# Patient Record
Sex: FEMALE | Race: BLACK OR AFRICAN AMERICAN | NOT HISPANIC OR LATINO | Employment: OTHER | URBAN - METROPOLITAN AREA
[De-identification: names, ages, dates, MRNs, and addresses within clinical notes are randomized per-mention and may not be internally consistent; named-entity substitution may affect disease eponyms.]

---

## 2017-04-06 ENCOUNTER — HOSPITAL ENCOUNTER (EMERGENCY)
Facility: OTHER | Age: 47
Discharge: HOME | End: 2017-04-06
Attending: INTERNAL MEDICINE
Payer: MEDICARE

## 2017-04-06 VITALS
HEIGHT: 62 IN | RESPIRATION RATE: 20 BRPM | DIASTOLIC BLOOD PRESSURE: 72 MMHG | SYSTOLIC BLOOD PRESSURE: 127 MMHG | TEMPERATURE: 98 F | WEIGHT: 120 LBS | HEART RATE: 57 BPM | OXYGEN SATURATION: 100 % | BODY MASS INDEX: 22.08 KG/M2

## 2017-04-06 DIAGNOSIS — K08.89 TOOTHACHE: Primary | ICD-10-CM

## 2017-04-06 PROCEDURE — 99283 EMERGENCY DEPT VISIT LOW MDM: CPT

## 2017-04-06 PROCEDURE — 25000003 PHARM REV CODE 250: Performed by: INTERNAL MEDICINE

## 2017-04-06 RX ORDER — IBUPROFEN 400 MG/1
800 TABLET ORAL
Status: COMPLETED | OUTPATIENT
Start: 2017-04-06 | End: 2017-04-06

## 2017-04-06 RX ORDER — CLINDAMYCIN HYDROCHLORIDE 150 MG/1
300 CAPSULE ORAL
Status: COMPLETED | OUTPATIENT
Start: 2017-04-06 | End: 2017-04-06

## 2017-04-06 RX ORDER — IBUPROFEN 800 MG/1
800 TABLET ORAL EVERY 8 HOURS PRN
Qty: 20 TABLET | Refills: 0 | Status: SHIPPED | OUTPATIENT
Start: 2017-04-06

## 2017-04-06 RX ORDER — CLINDAMYCIN HYDROCHLORIDE 150 MG/1
300 CAPSULE ORAL EVERY 8 HOURS
Qty: 30 CAPSULE | Refills: 0 | Status: SHIPPED | OUTPATIENT
Start: 2017-04-06 | End: 2017-04-16

## 2017-04-06 RX ADMIN — IBUPROFEN 800 MG: 400 TABLET ORAL at 10:04

## 2017-04-06 RX ADMIN — CLINDAMYCIN HYDROCHLORIDE 300 MG: 150 CAPSULE ORAL at 10:04

## 2017-04-06 NOTE — ED AVS SNAPSHOT
[facility] EMERGENCY DEPARTMENT  [address] [facility] [address]               Hansa Castro   2017  9:51 PM   ED    Description:  Female : 1970   Department:  [facility] Emergency Department           Your Care was Coordinated By:     Provider Role From To    Spenser Bragg MD Attending Provider [id number] --      Reason for Visit     Dental Pain           Diagnoses this Visit        Comments    Toothache    -  Primary       ED Disposition     ED Disposition Condition Comment    Discharge             To Do List           Follow-up Information     Follow up with Sabrina Schultz MD In 1 week(s).    Specialty:  Family Medicine    Contact information:    [address] [facility] [address]  [phone]         These Medications        Disp Refills Start End    ibuprofen (ADVIL,MOTRIN) 800 MG tablet 20 tablet 0 2017     Take 1 tablet (800 mg total) by mouth every 8 (eight) hours as needed for Pain. - Oral    Pharmacy: [facility] [address] [place] [address] Ph #: [phone]       clindamycin (CLEOCIN) 150 MG capsule 30 capsule 0 2017    Take 2 capsules (300 mg total) by mouth every 8 (eight) hours. - Oral    Pharmacy: [facility] [address] [place] [address] [address] [facility] Ph #: [phone]         [facility]     [facility] Nurse Care Line - 24/7 Assistance  Unless otherwise directed by your provider, please contact [facility], our nurse care line that is available for 24/7 assistance.     Registered nurses in the [facility] provide: appointment scheduling, clinical advisement, health education, and other advisory services.  Call: [phone] (toll free)               Medications           Message regarding Medications     Verify the changes and/or additions to your medication regime listed below are the same as discussed with your clinician today.  If any of these changes or  "additions are incorrect, please notify your healthcare provider.        START taking these NEW medications        Refills    ibuprofen (ADVIL,MOTRIN) 800 MG tablet 0    Sig: Take 1 tablet (800 mg total) by mouth every 8 (eight) hours as needed for Pain.    Class: Normal    Route: Oral    clindamycin (CLEOCIN) 150 MG capsule 0    Sig: Take 2 capsules (300 mg total) by mouth every 8 (eight) hours.    Class: Normal    Route: Oral      These medications were administered today        Dose Freq    clindamycin capsule 300 mg 300 mg ED 1 Time    Sig: Take 2 capsules (300 mg total) by mouth ED 1 Time.    Class: Normal    Route: Oral    ibuprofen tablet 800 mg 800 mg ED 1 Time    Sig: Take 2 tablets (800 mg total) by mouth ED 1 Time.    Class: Normal    Route: Oral           Verify that the below list of medications is an accurate representation of the medications you are currently taking.  If none reported, the list may be blank. If incorrect, please contact your healthcare provider. Carry this list with you in case of emergency.           Current Medications     clindamycin (CLEOCIN) 150 MG capsule Take 2 capsules (300 mg total) by mouth every 8 (eight) hours.    cyclobenzaprine (FLEXERIL) 5 MG tablet Take 1 tablet (5 mg total) by mouth 3 (three) times daily as needed.    ibuprofen (ADVIL,MOTRIN) 800 MG tablet Take 1 tablet (800 mg total) by mouth every 8 (eight) hours as needed for Pain.    omeprazole (PRILOSEC) 40 MG capsule Take 1 capsule (40 mg total) by mouth once daily.           Clinical Reference Information           Your Vitals Were     BP Pulse Temp Resp Height Weight    127/72 (BP Location: Left arm, Patient Position: Sitting) 57 97.7 °F (36.5 °C) (Oral) 20 5' 2" (1.575 m) 54.4 kg (120 lb)    SpO2 BMI             100% 21.95 kg/m2         Allergies as of 4/6/2017        Reactions    Pcn [Penicillins] Hives, Itching      Immunizations Administered on Date of Encounter - 4/6/2017     None      ED Micro, Lab, POCT  " "   None      ED Imaging Orders     None        Discharge Instructions           Dental Pain    A crack or cavity in a tooth can cause tooth pain. This is because the crack or cavity exposes the sensitive inner area of the tooth. An infection in the gum or the root of the tooth can cause pain and swelling. The pain is often made worse when you drink hot or cold beverages. It can also be worse when you bite on hard foods. Pain may spread from the tooth to your ear or the area of the jaw on the same side.  Home care  Follow these tips when caring for yourself at home:  · Avoid hot and cold foods and drinks. Your tooth may be sensitive to changes in temperature.  · Use toothpaste made for sensitive teeth. Brush gently up and down instead of sideways. Brushing sideways can wear away root surfaces if they are exposed.  · If your tooth is chipped or cracked, or if there is a large open cavity, put oil of cloves directly on the tooth to relieve pain. You can buy oil of cloves at drugstores. Some pharmacies carry an over-the-counter "toothache kit." This contains a paste that you can put on the exposed tooth to make it less sensitive.  · Put a cold pack on your jaw over the sore area to help reduce pain.  · You may use over-the-counter medicine to ease pain, unless your doctor prescribed another medicine. If you have chronic liver or kidney disease, talk with your healthcare provider before using acetaminophen or ibuprofen. Also talk with your provider if youve had a stomach ulcer or GI bleeding.  · If you have signs of an infection, you will be given an antibiotic. Take it as directed.  Follow-up care  Follow up with your dentist, or as advised. Your pain may go away with the treatment given today. But only a dentist can fully look at and treat the cause of your pain. This will keep the pain from coming back.  Call 911  Call 911 if any of these occur:  · Unusual drowsiness  · Headache or stiff neck  · Weakness or " fainting  · Difficulty swallowing or breathing  When to seek medical advice  Call your health care provider right away if any of these occur:  · Your face becomes swollen or red  · Pain gets worse or spreads to your neck  · Fever of 100.4º F (38.0º C) or higher, or as directed by your healthcare provider  · Pus drains from the tooth  Date Last Reviewed: 10/1/2016  © 6762-9302 [employer]. [address] [place] [address], [place] [address]. All rights reserved. This information is not intended as a substitute for professional medical care. Always follow your healthcare professional's instructions.          [facility] Sign-Up     Activating your [facility] account is as easy as 1-2-3!     1) Visit [email or web address], select Sign Up Now, enter this activation code and your date of birth, then select Next.  [email or web address]  Expires: 5/21/2017 10:18 PM      2) Create a username and password to use when you visit [facility] in the future and select a security question in case you lose your password and select Next.    3) Enter your e-mail address and click Sign Up!    Additional Information  If you have questions, please e-mail [email or web address] or call [phone] to talk to our [facility] staff. Remember, [facility] is NOT to be used for urgent needs. For medical emergencies, dial 911.          [facility] Emergency Department complies with applicable Federal civil rights laws and does not discriminate on the basis of race, color, national origin, age, disability, or sex.        Language Assistance Services     ATTENTION: Language assistance services are available, free of charge. Please call [phone].      ATENCIÓN: Si habla español, tiene a asher disposición servicios gratuitos de asistencia lingüística. Llame al [phone].     CHÚ Ý: N?u b?n nói Ti?ng Vi?t, có các d?ch v? h? tr? ngôn ng? mi?n phí dành cho b?n. G?i s? [phone].

## 2017-04-07 NOTE — DISCHARGE INSTRUCTIONS
"    Dental Pain    A crack or cavity in a tooth can cause tooth pain. This is because the crack or cavity exposes the sensitive inner area of the tooth. An infection in the gum or the root of the tooth can cause pain and swelling. The pain is often made worse when you drink hot or cold beverages. It can also be worse when you bite on hard foods. Pain may spread from the tooth to your ear or the area of the jaw on the same side.  Home care  Follow these tips when caring for yourself at home:  · Avoid hot and cold foods and drinks. Your tooth may be sensitive to changes in temperature.  · Use toothpaste made for sensitive teeth. Brush gently up and down instead of sideways. Brushing sideways can wear away root surfaces if they are exposed.  · If your tooth is chipped or cracked, or if there is a large open cavity, put oil of cloves directly on the tooth to relieve pain. You can buy oil of cloves at drugstores. Some pharmacies carry an over-the-counter "toothache kit." This contains a paste that you can put on the exposed tooth to make it less sensitive.  · Put a cold pack on your jaw over the sore area to help reduce pain.  · You may use over-the-counter medicine to ease pain, unless your doctor prescribed another medicine. If you have chronic liver or kidney disease, talk with your healthcare provider before using acetaminophen or ibuprofen. Also talk with your provider if youve had a stomach ulcer or GI bleeding.  · If you have signs of an infection, you will be given an antibiotic. Take it as directed.  Follow-up care  Follow up with your dentist, or as advised. Your pain may go away with the treatment given today. But only a dentist can fully look at and treat the cause of your pain. This will keep the pain from coming back.  Call 911  Call 911 if any of these occur:  · Unusual drowsiness  · Headache or stiff neck  · Weakness or fainting  · Difficulty swallowing or breathing  When to seek medical advice  Call " your health care provider right away if any of these occur:  · Your face becomes swollen or red  · Pain gets worse or spreads to your neck  · Fever of 100.4º F (38.0º C) or higher, or as directed by your healthcare provider  · Pus drains from the tooth  Date Last Reviewed: 10/1/2016  © 6316-4893 [employer]. [address] [place] [address]. All rights reserved. This information is not intended as a substitute for professional medical care. Always follow your healthcare professional's instructions.

## 2017-04-07 NOTE — ED PROVIDER NOTES
Encounter Date: 4/6/2017       History     Chief Complaint   Patient presents with    Dental Pain     + dental pain to the left upper jaw. patient had a tooth pulled and reports she needs the rest of her teeth pulled and the jaw line is hurting her . No obvious swelling noted     Review of patient's allergies indicates:   Allergen Reactions    Pcn [penicillins] Hives and Itching     Patient is a 46 y.o. female presenting with the following complaint: tooth pain. The history is provided by the patient. No  was used.   Dental Pain   The primary symptoms include mouth pain. The symptoms began yesterday. The symptoms are worsening. The symptoms are new. The symptoms occur constantly.   Mouth pain began 12 - 24 hours ago. Mouth pain occurs constantly. Mouth pain is worsening.   Additional symptoms include: dental sensitivity to temperature, gum swelling and gum tenderness. Additional symptoms do not include: purulent gums, trismus, facial swelling, trouble swallowing, pain with swallowing, excessive salivation, dry mouth, taste disturbance, smell disturbance, ear pain, hearing loss, nosebleeds, swollen glands and fatigue.     Past Medical History:   Diagnosis Date    Depression     Lower back pain     Sickle cell trait      Past Surgical History:   Procedure Laterality Date    TUBAL LIGATION       Family History   Problem Relation Age of Onset    Seizures Mother     Schizophrenia Mother     Mental illness Mother      Social History   Substance Use Topics    Smoking status: Never Smoker    Smokeless tobacco: Never Used    Alcohol use No     Review of Systems   Constitutional: Negative.  Negative for fatigue.   HENT: Positive for dental problem. Negative for ear pain, facial swelling, hearing loss, nosebleeds and trouble swallowing.    Eyes: Negative.    Respiratory: Negative.    Cardiovascular: Negative.    Gastrointestinal: Negative.    Musculoskeletal: Negative.    Neurological: Negative.     Hematological: Negative.        Physical Exam   Initial Vitals   BP Pulse Resp Temp SpO2   04/06/17 2130 04/06/17 2130 04/06/17 2130 04/06/17 2130 04/06/17 2130   127/72 57 20 97.7 °F (36.5 °C) 100 %     Physical Exam    Nursing note and vitals reviewed.  Constitutional: She appears well-developed and well-nourished.   HENT:   Head: Normocephalic and atraumatic.   Left upper molar pain with surrounding gingivitis.  Dental caries present   Eyes: EOM are normal.   Neck: Normal range of motion.   Cardiovascular: Normal rate and regular rhythm.   Pulmonary/Chest: Breath sounds normal.   Abdominal: Soft.   Neurological: She is alert and oriented to person, place, and time.   Skin: Skin is warm and dry.         ED Course   Procedures  Labs Reviewed - No data to display          Medical Decision Making:   Differential Diagnosis:   Toothache  Tooth abscess                   Labs Reviewed  No visits with results within 1 Day(s) from this visit.  Latest known visit with results is:    Office Visit on 09/15/2016   Component Date Value Ref Range Status    WBC 02/06/2017 5.6  3.4 - 10.8 x10E3/uL Final    RBC 02/06/2017 5.30* 3.77 - 5.28 x10E6/uL Final    Hemoglobin 02/06/2017 12.2  11.1 - 15.9 g/dL Final    Hematocrit 02/06/2017 38.4  34.0 - 46.6 % Final    MCV 02/06/2017 73* 79 - 97 fL Final    MCH 02/06/2017 23.0* 26.6 - 33.0 pg Final    MCHC 02/06/2017 31.8  31.5 - 35.7 g/dL Final    RDW 02/06/2017 14.4  12.3 - 15.4 % Final    Platelets 02/06/2017 225  150 - 379 x10E3/uL Final    Neutrophils Relative 02/06/2017 65  % Final    Lymph% 02/06/2017 22  % Final    Mono% 02/06/2017 10  % Final    Eosinophil% 02/06/2017 2  % Final    Basophil% 02/06/2017 1  % Final    Neutrophils Absolute 02/06/2017 3.7  1.4 - 7.0 x10E3/uL Final    Lymph # 02/06/2017 1.2  0.7 - 3.1 x10E3/uL Final    Mono # 02/06/2017 0.6  0.1 - 0.9 x10E3/uL Final    Eos # 02/06/2017 0.1  0.0 - 0.4 x10E3/uL Final    Baso # 02/06/2017 0.0  0.0 -  "0.2 x10E3/uL Final    Immature Granulocytes 02/06/2017 0  % Final    Immature Grans (Abs) 02/06/2017 0.0  0.0 - 0.1 x10E3/uL Final    Glucose 02/06/2017 91  65 - 99 mg/dL Final    BUN, Bld 02/06/2017 18  6 - 24 mg/dL Final    Creatinine 02/06/2017 0.74  0.57 - 1.00 mg/dL Final    eGFR if non African American 02/06/2017 98  >59 mL/min/1.73 Final    eGFR if  02/06/2017 113  >59 mL/min/1.73 Final    BUN/Creatinine Ratio 02/06/2017 24* 9 - 23 Final    Sodium 02/06/2017 147* 134 - 144 mmol/L Final    Potassium 02/06/2017 6.4* 3.5 - 5.2 mmol/L Final    Chloride 02/06/2017 110* 96 - 106 mmol/L Final    CO2 02/06/2017 21  18 - 29 mmol/L Final    Calcium 02/06/2017 9.1  8.7 - 10.2 mg/dL Final    Total Protein 02/06/2017 6.6  6.0 - 8.5 g/dL Final    Albumin 02/06/2017 4.0  3.5 - 5.5 g/dL Final    Globulin, Total 02/06/2017 2.6  1.5 - 4.5 g/dL Final    Albumin/Globulin Ratio 02/06/2017 1.5  1.1 - 2.5 Final    Total Bilirubin 02/06/2017 0.5  0.0 - 1.2 mg/dL Final    Alkaline Phosphatase 02/06/2017 44  39 - 117 IU/L Final    AST 02/06/2017 12  0 - 40 IU/L Final    ALT 02/06/2017 6  0 - 32 IU/L Final    Cholesterol 02/06/2017 142  100 - 199 mg/dL Final    Triglycerides 02/06/2017 30  0 - 149 mg/dL Final    HDL 02/06/2017 63  >39 mg/dL Final    VLDL Cholesterol Yao 02/06/2017 6  5 - 40 mg/dL Final    LDL Calculated 02/06/2017 73  0 - 99 mg/dL Final    Please note 02/06/2017 Comment   Final    Comment: The date and/or time of collection was not indicated on the  requisition as required by state and federal law.  The date of  receipt of the specimen was used as the collection date if not  supplied.      Specimen Status Report 02/06/2017 Comment   Final    Comment: One Specimen Identifier  One Specimen Identifier  The specimen received included only one patient identifier on the  primary collection container.  Our laboratory accrediting agency  states "All primary specimen containers must " "be labeled with 2  identifiers at the time of collection."          Imaging Reviewed    Imaging Results     None          Medications given in ED    Medications   clindamycin capsule 300 mg (300 mg Oral Given 4/6/17 2233)   ibuprofen tablet 800 mg (800 mg Oral Given 4/6/17 2233)       Discharge Medications     Discharge Medication List as of 4/6/2017 10:36 PM      START taking these medications    Details   clindamycin (CLEOCIN) 150 MG capsule Take 2 capsules (300 mg total) by mouth every 8 (eight) hours., Starting 4/6/2017, Until Sun 4/16/17, Normal      ibuprofen (ADVIL,MOTRIN) 800 MG tablet Take 1 tablet (800 mg total) by mouth every 8 (eight) hours as needed for Pain., Starting 4/6/2017, Until Discontinued, Normal         CONTINUE these medications which have NOT CHANGED    Details   cyclobenzaprine (FLEXERIL) 5 MG tablet Take 1 tablet (5 mg total) by mouth 3 (three) times daily as needed., Starting 9/15/2016, Until Discontinued, Normal      omeprazole (PRILOSEC) 40 MG capsule Take 1 capsule (40 mg total) by mouth once daily., Starting 9/15/2016, Until Fri 9/15/17, Normal                   Patient discharged to home in stable condition with instructions to:   1. Please take all meds as prescribed.  2. Follow-up with your primary care doctor   3. Return precautions discussed and patient and/or family/caretaker understands to return to the emergency room for any concerns including worsening of your current symptoms, fever, chills, night sweats, worsening pain, chest pain, shortness of breath, nausea, vomiting, diarrhea, bleeding, headache, difficulty talking, visual disturbances, weakness, numbness or any other acute concerns       ED Course     Clinical Impression:   Dental caries  Toothache  Disposition:   Disposition: Discharged  Condition: Stable       Spenser Bragg MD  04/08/17 9079    "

## 2017-07-18 PROBLEM — E87.5 HYPERKALEMIA: Status: ACTIVE | Noted: 2017-07-18

## 2017-07-26 ENCOUNTER — HOSPITAL ENCOUNTER (OUTPATIENT)
Dept: RADIOLOGY | Facility: HOSPITAL | Age: 47
Discharge: HOME | End: 2017-07-26
Payer: MEDICARE

## 2017-07-26 PROCEDURE — 71020 XR CHEST PA AND LATERAL: CPT | Mod: 26,,, | Performed by: RADIOLOGY

## 2017-07-26 PROCEDURE — 71020 XR CHEST PA AND LATERAL: CPT | Mod: TC

## 2018-11-01 ENCOUNTER — OFFICE VISIT (OUTPATIENT)
Dept: URGENT CARE | Facility: CLINIC | Age: 48
End: 2018-11-01
Payer: MEDICARE

## 2018-11-01 VITALS
SYSTOLIC BLOOD PRESSURE: 138 MMHG | WEIGHT: 118 LBS | DIASTOLIC BLOOD PRESSURE: 86 MMHG | OXYGEN SATURATION: 99 % | HEART RATE: 64 BPM | BODY MASS INDEX: 21.58 KG/M2 | TEMPERATURE: 99 F

## 2018-11-01 DIAGNOSIS — A08.4 VIRAL GASTROENTERITIS: Primary | ICD-10-CM

## 2018-11-01 PROCEDURE — 3008F BODY MASS INDEX DOCD: CPT | Mod: CPTII,S$GLB,, | Performed by: NURSE PRACTITIONER

## 2018-11-01 PROCEDURE — 99214 OFFICE O/P EST MOD 30 MIN: CPT | Mod: S$GLB,,, | Performed by: NURSE PRACTITIONER

## 2018-11-01 RX ORDER — DICYCLOMINE HYDROCHLORIDE 10 MG/1
10 CAPSULE ORAL 4 TIMES DAILY
Qty: 30 CAPSULE | Refills: 0 | Status: SHIPPED | OUTPATIENT
Start: 2018-11-01 | End: 2018-11-15

## 2018-11-01 RX ORDER — ONDANSETRON 4 MG/1
4 TABLET, FILM COATED ORAL DAILY PRN
Qty: 30 TABLET | Refills: 1 | Status: SHIPPED | OUTPATIENT
Start: 2018-11-01 | End: 2019-11-01

## 2018-11-01 NOTE — PATIENT INSTRUCTIONS

## 2019-03-04 ENCOUNTER — OFFICE VISIT (OUTPATIENT)
Dept: URGENT CARE | Facility: CLINIC | Age: 49
End: 2019-03-04
Payer: MEDICARE

## 2019-03-04 VITALS
SYSTOLIC BLOOD PRESSURE: 110 MMHG | DIASTOLIC BLOOD PRESSURE: 73 MMHG | BODY MASS INDEX: 22.08 KG/M2 | TEMPERATURE: 98 F | HEART RATE: 66 BPM | HEIGHT: 62 IN | WEIGHT: 120 LBS | RESPIRATION RATE: 18 BRPM | OXYGEN SATURATION: 100 %

## 2019-03-04 DIAGNOSIS — R03.0 BLOOD PRESSURE ELEVATED WITHOUT HISTORY OF HTN: Primary | ICD-10-CM

## 2019-03-04 PROCEDURE — 99214 PR OFFICE/OUTPT VISIT, EST, LEVL IV, 30-39 MIN: ICD-10-PCS | Mod: S$GLB,,, | Performed by: FAMILY MEDICINE

## 2019-03-04 PROCEDURE — 99214 OFFICE O/P EST MOD 30 MIN: CPT | Mod: S$GLB,,, | Performed by: FAMILY MEDICINE

## 2019-03-04 PROCEDURE — 3008F BODY MASS INDEX DOCD: CPT | Mod: CPTII,S$GLB,, | Performed by: FAMILY MEDICINE

## 2019-03-04 PROCEDURE — 3008F PR BODY MASS INDEX (BMI) DOCUMENTED: ICD-10-PCS | Mod: CPTII,S$GLB,, | Performed by: FAMILY MEDICINE

## 2019-03-04 NOTE — PROGRESS NOTES
"Subjective:       Patient ID: Hansa Castro is a 48 y.o. female.    Vitals:  height is 5' 2" (1.575 m) and weight is 54.4 kg (120 lb). Her temperature is 97.7 °F (36.5 °C). Her blood pressure is 110/73 and her pulse is 66. Her respiration is 18 and oxygen saturation is 100%.     Chief Complaint: Hypertension    Patient is here for blood pressure check.  She Has been checking her blood pressure at home and reports systolic readings of 135, 142.  No chest pain or palpitations.  She reports that her primary care provider had requested a stress test, and this was negative.  No history of hypertension.  No recent decongestants.  No chest pain or shortness of breath.      Hypertension   This is a new problem. Past treatments include nothing.     <OUCOOHADULT>    Objective:      Physical Exam   Constitutional: She is oriented to person, place, and time. She appears well-developed and well-nourished. She is cooperative.  Non-toxic appearance. She does not appear ill. No distress.   HENT:   Head: Normocephalic and atraumatic.   Right Ear: Hearing, tympanic membrane and ear canal normal.   Left Ear: Hearing, tympanic membrane and ear canal normal.   Nose: Nose normal. No mucosal edema, rhinorrhea or nasal deformity. No epistaxis. Right sinus exhibits no maxillary sinus tenderness and no frontal sinus tenderness. Left sinus exhibits no maxillary sinus tenderness and no frontal sinus tenderness.   Mouth/Throat: Uvula is midline, oropharynx is clear and moist and mucous membranes are normal. No trismus in the jaw. Normal dentition. No uvula swelling. No oropharyngeal exudate or posterior oropharyngeal erythema.   Eyes: Conjunctivae, EOM and lids are normal. Pupils are equal, round, and reactive to light. Right eye exhibits no discharge. Left eye exhibits no discharge. No scleral icterus.   Sclera clear bilat   Neck: Trachea normal, normal range of motion, full passive range of motion without pain and phonation normal. Neck " supple.   Cardiovascular: Normal rate, regular rhythm, normal heart sounds, intact distal pulses and normal pulses.   No murmur heard.  Pulmonary/Chest: Effort normal and breath sounds normal. No stridor. No respiratory distress. She has no wheezes. She has no rales.   Abdominal: Normal appearance. She exhibits no pulsatile midline mass.   Musculoskeletal: Normal range of motion. She exhibits no edema or deformity.   Lymphadenopathy:     She has no cervical adenopathy.   Neurological: She is alert and oriented to person, place, and time. She exhibits normal muscle tone. Coordination normal.   Skin: Skin is warm, dry and intact. She is not diaphoretic. No pallor.   Psychiatric: She has a normal mood and affect. Her speech is normal and behavior is normal. Judgment and thought content normal. Cognition and memory are normal.   Nursing note and vitals reviewed.      Assessment:       1. Blood pressure elevated without history of HTN        Plan:         Blood pressure elevated without history of HTN     PATIENT REASSURED.  BLOOD PRESSURE WAS NOT ELEVATED TODAY.    GOAL IS LESS THAN 140/90.    RECHECK IMMEDIATELY IF GREATER THAN 170/100.      BE SURE TO RECHECK WITH YOUR PRIMARY CARE PROVIDER WITH ANY CONCERNS.    Make sure that you follow up with your primary care doctor in the next 2-5 days if needed .  Return to the Urgent Care if signs or symptoms change and certainly if you have worsening symptoms go to the nearest emergency department for further evaluation.

## 2019-03-04 NOTE — PATIENT INSTRUCTIONS
Exercise: Adding Intensity  You have been exercising for 30 minutes most days of the week. Now you can move on to the next stage: increasing the intensity. This means doing your activity in one or more of these ways:  · Longer. Exercise for 30 minutes or more without a break.  · Faster. Hike, run, or skate fast enough to raise your heart rate moderately--as if you had walked fast to catch a bus.  · More often. Do your activity 4 to 6 times a week instead of 1 to 3 times.    Not just gym class  Be creative. You can reach your health and fitness goals in many ways. Try some of these activities:  · Team sports, like basketball or soccer  · Social or recreational activities, like hiking or dancing  · Individual exercise, like cycling, swimming, or skating  · Group fitness classes, like aerobic classes or weight training  Safety first  Whatever activity you choose, think about safety:  · Wear the right safety gear and shoes for your activity.  · Drink plenty of water during and after workouts.  · Wear light-colored clothing if youre out when its dark.  · Make time to warm up before you exercise and cool down after.  · Carry ID (identification) with you if youre out alone. And be sure someone knows where youre going.  · If youre on foot, travel against traffic (except on blind corners). If youre on a bike, go with traffic. Obey the rules of the road.   Tips for sticking with it  · Find a workout partner or sports club. If you know someone is expecting you, youll be less likely to skip your workout.  · Pack a workout bag with everything you need. Then its ready when you are.  · Choose a few different activities so youll stay interested. Make it fun!  What will help you to stick with it?  1.   2.   3.   Date Last Reviewed: 8/13/2015  © 1998-9772 [employer]. [address] [place] [address], [place] [address]. All rights reserved. This information is not intended as a substitute for professional medical care.  Always follow your healthcare professional's instructions.      BE SURE TO RECHECK WITH YOUR PRIMARY CARE PROVIDER WITH ANY CONCERNS.    Make sure that you follow up with your primary care doctor in the next 2-5 days if needed .  Return to the Urgent Care if signs or symptoms change and certainly if you have worsening symptoms go to the nearest emergency department for further evaluation.

## 2024-06-20 ENCOUNTER — HOSPITAL ENCOUNTER (EMERGENCY)
Facility: HOSPITAL | Age: 54
Discharge: HOME | End: 2024-06-20
Attending: EMERGENCY MEDICINE
Payer: MEDICARE

## 2024-06-20 VITALS
OXYGEN SATURATION: 95 % | TEMPERATURE: 101 F | DIASTOLIC BLOOD PRESSURE: 55 MMHG | HEIGHT: 62 IN | HEART RATE: 97 BPM | WEIGHT: 140 LBS | BODY MASS INDEX: 25.76 KG/M2 | SYSTOLIC BLOOD PRESSURE: 101 MMHG | RESPIRATION RATE: 18 BRPM

## 2024-06-20 DIAGNOSIS — R05.1 ACUTE COUGH: ICD-10-CM

## 2024-06-20 DIAGNOSIS — J02.9 SORE THROAT: ICD-10-CM

## 2024-06-20 DIAGNOSIS — R50.9 FEVER IN ADULT: ICD-10-CM

## 2024-06-20 DIAGNOSIS — U07.1 COVID-19: Primary | ICD-10-CM

## 2024-06-20 DIAGNOSIS — R09.81 NASAL CONGESTION: ICD-10-CM

## 2024-06-20 LAB
B-HCG UR QL: NEGATIVE
CTP QC/QA: YES
MOLECULAR STREP A: NEGATIVE
SARS-COV-2 RDRP RESP QL NAA+PROBE: POSITIVE

## 2024-06-20 PROCEDURE — 87635 SARS-COV-2 COVID-19 AMP PRB: CPT

## 2024-06-20 PROCEDURE — 99284 EMERGENCY DEPT VISIT MOD MDM: CPT

## 2024-06-20 PROCEDURE — 81025 URINE PREGNANCY TEST: CPT

## 2024-06-20 PROCEDURE — 25000003 PHARM REV CODE 250

## 2024-06-20 PROCEDURE — 87651 STREP A DNA AMP PROBE: CPT

## 2024-06-20 RX ORDER — IBUPROFEN 600 MG/1
600 TABLET ORAL EVERY 6 HOURS PRN
Qty: 30 TABLET | Refills: 0 | Status: SHIPPED | OUTPATIENT
Start: 2024-06-20

## 2024-06-20 RX ORDER — ACETAMINOPHEN 500 MG
1000 TABLET ORAL
Status: COMPLETED | OUTPATIENT
Start: 2024-06-20 | End: 2024-06-20

## 2024-06-20 RX ORDER — ACETAMINOPHEN 500 MG
500 TABLET ORAL EVERY 4 HOURS PRN
Qty: 30 TABLET | Refills: 0 | Status: SHIPPED | OUTPATIENT
Start: 2024-06-20

## 2024-06-20 RX ORDER — BENZONATATE 100 MG/1
100 CAPSULE ORAL 3 TIMES DAILY PRN
Qty: 30 CAPSULE | Refills: 0 | Status: SHIPPED | OUTPATIENT
Start: 2024-06-20

## 2024-06-20 RX ORDER — PROMETHAZINE HYDROCHLORIDE AND DEXTROMETHORPHAN HYDROBROMIDE 6.25; 15 MG/5ML; MG/5ML
5 SYRUP ORAL EVERY 4 HOURS PRN
Qty: 180 ML | Refills: 0 | Status: SHIPPED | OUTPATIENT
Start: 2024-06-20

## 2024-06-20 RX ORDER — CETIRIZINE HYDROCHLORIDE 10 MG/1
10 TABLET ORAL DAILY PRN
Qty: 30 TABLET | Refills: 0 | Status: SHIPPED | OUTPATIENT
Start: 2024-06-20

## 2024-06-20 RX ORDER — FLUTICASONE PROPIONATE 50 MCG
1 SPRAY, SUSPENSION (ML) NASAL 2 TIMES DAILY PRN
Qty: 15 G | Refills: 0 | Status: SHIPPED | OUTPATIENT
Start: 2024-06-20

## 2024-06-20 RX ORDER — IBUPROFEN 600 MG/1
600 TABLET ORAL
Status: COMPLETED | OUTPATIENT
Start: 2024-06-20 | End: 2024-06-20

## 2024-06-20 RX ADMIN — ACETAMINOPHEN 1000 MG: 500 TABLET ORAL at 09:06

## 2024-06-20 RX ADMIN — IBUPROFEN 600 MG: 600 TABLET ORAL at 09:06

## 2024-06-21 NOTE — ED PROVIDER NOTES
Encounter Date: 6/20/2024    SCRIBE #1 NOTE: I, Amy Katherin, am scribing for, and in the presence of,  Alayna Holdsworth, PA-C.       History     Chief Complaint   Patient presents with    Sore Throat     Pt c/o sore throat x 3 days. Reports taking amoxicillin today. Pt febrile in triage.     54 yo F w/ PMHx of depression and sickle cell trait presenting to the ED for sore throat x3 days, worsening today. Associated sx include congestion, rhinorrhea, productive cough. Attempted tx w/ amoxicillin. No other exacerbating or alleviating factors. Denies fever, NVD, myalgias, headache, drooling, dysphagia, or other associated symptoms. No sick contacts.     The history is provided by the patient.     Review of patient's allergies indicates:   Allergen Reactions    Pcn [penicillins] Hives and Itching     Past Medical History:   Diagnosis Date    Depression     Lower back pain     Sickle cell trait      Past Surgical History:   Procedure Laterality Date    TUBAL LIGATION       Family History   Problem Relation Name Age of Onset    Seizures Mother      Schizophrenia Mother      Mental illness Mother       Social History     Tobacco Use    Smoking status: Never    Smokeless tobacco: Never   Substance Use Topics    Alcohol use: No    Drug use: No     Review of Systems   Constitutional:  Negative for chills, diaphoresis and fever.   HENT:  Positive for congestion, rhinorrhea and sore throat. Negative for drooling and trouble swallowing.    Respiratory:  Positive for cough. Negative for shortness of breath.    Cardiovascular:  Negative for chest pain.   Gastrointestinal:  Negative for abdominal pain, constipation, diarrhea, nausea and vomiting.   Musculoskeletal:  Negative for myalgias and neck pain.   Skin:  Negative for rash.   Neurological:  Negative for dizziness, weakness, light-headedness and headaches.       Physical Exam     Initial Vitals [06/20/24 2020]   BP Pulse Resp Temp SpO2   121/70 (!) 111 20 (!) 102.2 °F (39 °C)  100 %      MAP       --         Physical Exam    Nursing note and vitals reviewed.  Constitutional: She appears well-developed and well-nourished. She is not diaphoretic. She is active. She does not appear ill. No distress.   HENT:   Head: Normocephalic and atraumatic.   Right Ear: External ear normal.   Left Ear: External ear normal.   Nose: Nose normal.   Mouth/Throat: Uvula is midline, oropharynx is clear and moist and mucous membranes are normal.   Eyes: Conjunctivae, EOM and lids are normal. Pupils are equal, round, and reactive to light. Right eye exhibits no discharge. Left eye exhibits no discharge.   Neck: Phonation normal. Neck supple.   No drooling, no hot potato voice, no neck edema or erythema, and normal neck ROM.   Normal range of motion.   Full passive range of motion without pain.     Cardiovascular:  Regular rhythm.   Tachycardia present.         Pulmonary/Chest: Effort normal and breath sounds normal. No respiratory distress.   Abdominal: She exhibits no distension.   Musculoskeletal:         General: Normal range of motion.      Cervical back: Full passive range of motion without pain, normal range of motion and neck supple.     Neurological: She is alert and oriented to person, place, and time. She has normal strength. No sensory deficit. GCS eye subscore is 4. GCS verbal subscore is 5. GCS motor subscore is 6.   Skin: Skin is dry. Capillary refill takes less than 2 seconds.         ED Course   Procedures  Labs Reviewed   SARS-COV-2 RDRP GENE - Abnormal; Notable for the following components:       Result Value    POC Rapid COVID Positive (*)     All other components within normal limits   POCT STREP A MOLECULAR   POCT URINE PREGNANCY          Imaging Results    None          Medications   acetaminophen tablet 1,000 mg (1,000 mg Oral Given 6/20/24 2110)   ibuprofen tablet 600 mg (600 mg Oral Given 6/20/24 2143)     Medical Decision Making  52 yo F w/ PMHx of depression and sickle cell trait  presenting to the ED for sore throat x3 days, worsening today.   Patient's chart and medical history reviewed.    Ddx:  COVID  Flu  Strep throat  Viral URI    Patient's vitals reviewed.  She is febrile, no respiratory distress, and nontoxic-appearing in the ED. patient had tachycardia on exam, otherwise unremarkable.  Patient given Tylenol for her fever.  Patient is strep negative. Patient is COVID positive. Patient's O2 sat is 100% on room air with no respiratory distress and bilateral normal breath sounds.  Repeat vitals show tachycardia now in normal range.  Temperature is more elevated but nursing staff did not give tylenol till 1 hour after order was placed.  Patient given Motrin.  Repeat vitals show temperature trending down and tachycardia resolved.  Discussed with patient she will need to quarantine until afebrile for 24 hours without taking any medications that would lower temperature or any new symptoms developing. Consider but unlikely Aldo's angina and retropharyngeal abscess after history and physical exam; no stridor, drooling, trismus, neck tenderness on palpation, and neck pain with ROM. Discussed with patient she can use Tylenol and ibuprofen as needed for fevers and headaches, as well as staying hydrated and resting until this clears from her system. Patient given will be sent home on Motrin, Tylenol, Flonase, Zyrtec, Tessalon Perles, benzocaine lozenges, guaifenesin cough syrup, and promethazine DM cough syrup for symptomatic control.  Patient agrees with this plan. Discussed with her strict return precautions, she verbalized understanding. Patient is stable for discharge.     Amount and/or Complexity of Data Reviewed  Labs: ordered. Decision-making details documented in ED Course.    Risk  OTC drugs.  Prescription drug management.            Scribe Attestation:   Scribe #1: I performed the above scribed service and the documentation accurately describes the services I performed. I attest to the  accuracy of the note.                           I, Alayna Holdsworth,PA-C, personally performed the services described in this documentation. All medical record entries made by the scribe were at my direction and in my presence. I have reviewed the chart and agree that the record reflects my personal performance and is accurate and complete.      Clinical Impression:  Final diagnoses:  [U07.1] COVID-19 (Primary)  [R50.9] Fever in adult  [J02.9] Sore throat  [R05.1] Acute cough  [R09.81] Nasal congestion          ED Disposition Condition    Discharge Stable          ED Prescriptions       Medication Sig Dispense Start Date End Date Auth. Provider    benzocaine-menthoL 6-10 mg lozenge Take 1 lozenge by mouth every 2 (two) hours as needed for Pain (sore throat). 18 tablet 6/20/2024 -- Holdsworth, Alayna, PA-C    cetirizine (ZYRTEC) 10 MG tablet Take 1 tablet (10 mg total) by mouth daily as needed for Allergies or Rhinitis. 30 tablet 6/20/2024 -- Holdsworth, Alayna, PA-C    fluticasone propionate (FLONASE) 50 mcg/actuation nasal spray 1 spray (50 mcg total) by Each Nostril route 2 (two) times daily as needed for Rhinitis or Allergies. 15 g 6/20/2024 -- Holdsworth, Alayna, PA-C    benzonatate (TESSALON) 100 MG capsule Take 1 capsule (100 mg total) by mouth 3 (three) times daily as needed for Cough. 30 capsule 6/20/2024 -- Holdsworth, Alayna, PA-C    promethazine-dextromethorphan (PROMETHAZINE-DM) 6.25-15 mg/5 mL Syrp Take 5 mLs by mouth every 4 (four) hours as needed (cough). 180 mL 6/20/2024 -- Holdsworth, Alayna, PA-C    ibuprofen (ADVIL,MOTRIN) 600 MG tablet Take 1 tablet (600 mg total) by mouth every 6 (six) hours as needed for Temperature greater than or Pain. 30 tablet 6/20/2024 -- Holdsworth, Alayna, PA-C    acetaminophen (TYLENOL) 500 MG tablet Take 1 tablet (500 mg total) by mouth every 4 (four) hours as needed for Temperature greater than or Pain. 30 tablet 6/20/2024 -- Holdsworth, Alayna, PA-C           Follow-up Information       Follow up With Specialties Details Why Contact Info    [facility] [place] - Emergency Dept Emergency Medicine  If symptoms worsen [address] [facility] [place] [address] [phone]             Holdsworth, Alayna, PA-C  06/20/24 0187

## 2024-06-21 NOTE — DISCHARGE INSTRUCTIONS
